# Patient Record
Sex: FEMALE | Race: WHITE | NOT HISPANIC OR LATINO | Employment: UNEMPLOYED | URBAN - NONMETROPOLITAN AREA
[De-identification: names, ages, dates, MRNs, and addresses within clinical notes are randomized per-mention and may not be internally consistent; named-entity substitution may affect disease eponyms.]

---

## 2023-12-31 DIAGNOSIS — A59.9 TRICHIMONIASIS: Primary | ICD-10-CM

## 2023-12-31 RX ORDER — METRONIDAZOLE 500 MG/1
500 TABLET ORAL 2 TIMES DAILY
Qty: 14 TABLET | Refills: 0 | Status: SHIPPED | OUTPATIENT
Start: 2023-12-31

## 2024-04-30 ENCOUNTER — TRANSCRIBE ORDERS (OUTPATIENT)
Dept: LAB | Facility: HOSPITAL | Age: 24
End: 2024-04-30
Payer: COMMERCIAL

## 2024-04-30 ENCOUNTER — LAB (OUTPATIENT)
Dept: LAB | Facility: HOSPITAL | Age: 24
End: 2024-04-30
Payer: COMMERCIAL

## 2024-04-30 DIAGNOSIS — R53.82 CHRONIC FATIGUE: ICD-10-CM

## 2024-04-30 DIAGNOSIS — Z20.2 VENEREAL DISEASE CONTACT: ICD-10-CM

## 2024-04-30 DIAGNOSIS — E11.9 DIABETES MELLITUS WITHOUT COMPLICATION: ICD-10-CM

## 2024-04-30 DIAGNOSIS — E78.5 HYPERLIPIDEMIA, UNSPECIFIED HYPERLIPIDEMIA TYPE: ICD-10-CM

## 2024-04-30 DIAGNOSIS — Z13.30 ENCOUNTER FOR SCREENING EXAMINATION FOR MENTAL HEALTH AND BEHAVIORAL DISORDERS, UNSPECIFIED: ICD-10-CM

## 2024-04-30 DIAGNOSIS — D51.9 ANEMIA DUE TO VITAMIN B12 DEFICIENCY, UNSPECIFIED B12 DEFICIENCY TYPE: ICD-10-CM

## 2024-04-30 DIAGNOSIS — Z13.30 ENCOUNTER FOR BEHAVIORAL HEALTH SCREENING: ICD-10-CM

## 2024-04-30 DIAGNOSIS — I10 ESSENTIAL HYPERTENSION, MALIGNANT: ICD-10-CM

## 2024-04-30 DIAGNOSIS — Z13.30 ENCOUNTER FOR BEHAVIORAL HEALTH SCREENING: Primary | ICD-10-CM

## 2024-04-30 DIAGNOSIS — E55.9 VITAMIN D DEFICIENCY: ICD-10-CM

## 2024-04-30 LAB
DEPRECATED RDW RBC AUTO: 40.1 FL (ref 37–54)
ERYTHROCYTE [DISTWIDTH] IN BLOOD BY AUTOMATED COUNT: 12.2 % (ref 12.3–15.4)
HCT VFR BLD AUTO: 36 % (ref 34–46.6)
HGB BLD-MCNC: 11.9 G/DL (ref 12–15.9)
MCH RBC QN AUTO: 29.8 PG (ref 26.6–33)
MCHC RBC AUTO-ENTMCNC: 33.1 G/DL (ref 31.5–35.7)
MCV RBC AUTO: 90 FL (ref 79–97)
PLATELET # BLD AUTO: 297 10*3/MM3 (ref 140–450)
PMV BLD AUTO: 10.5 FL (ref 6–12)
RBC # BLD AUTO: 4 10*6/MM3 (ref 3.77–5.28)
T3FREE SERPL-MCNC: 2.61 PG/ML (ref 2–4.4)
T4 SERPL-MCNC: 5.6 MCG/DL (ref 4.5–11.7)
WBC NRBC COR # BLD AUTO: 6.28 10*3/MM3 (ref 3.4–10.8)

## 2024-04-30 PROCEDURE — 86695 HERPES SIMPLEX TYPE 1 TEST: CPT

## 2024-04-30 PROCEDURE — 80074 ACUTE HEPATITIS PANEL: CPT

## 2024-04-30 PROCEDURE — 80061 LIPID PANEL: CPT

## 2024-04-30 PROCEDURE — 83036 HEMOGLOBIN GLYCOSYLATED A1C: CPT

## 2024-04-30 PROCEDURE — G0432 EIA HIV-1/HIV-2 SCREEN: HCPCS

## 2024-04-30 PROCEDURE — 82306 VITAMIN D 25 HYDROXY: CPT

## 2024-04-30 PROCEDURE — 80050 GENERAL HEALTH PANEL: CPT

## 2024-04-30 PROCEDURE — 84436 ASSAY OF TOTAL THYROXINE: CPT

## 2024-04-30 PROCEDURE — 84481 FREE ASSAY (FT-3): CPT

## 2024-04-30 PROCEDURE — 86696 HERPES SIMPLEX TYPE 2 TEST: CPT

## 2024-04-30 PROCEDURE — 36415 COLL VENOUS BLD VENIPUNCTURE: CPT

## 2024-04-30 PROCEDURE — 82607 VITAMIN B-12: CPT

## 2024-04-30 PROCEDURE — 86592 SYPHILIS TEST NON-TREP QUAL: CPT

## 2024-05-01 LAB
25(OH)D3 SERPL-MCNC: 25.8 NG/ML (ref 30–100)
ALBUMIN SERPL-MCNC: 4 G/DL (ref 3.5–5.2)
ALBUMIN/GLOB SERPL: 1.5 G/DL
ALP SERPL-CCNC: 110 U/L (ref 39–117)
ALT SERPL W P-5'-P-CCNC: 12 U/L (ref 1–33)
ANION GAP SERPL CALCULATED.3IONS-SCNC: 9.2 MMOL/L (ref 5–15)
AST SERPL-CCNC: 16 U/L (ref 1–32)
BILIRUB SERPL-MCNC: 0.4 MG/DL (ref 0–1.2)
BUN SERPL-MCNC: 12 MG/DL (ref 6–20)
BUN/CREAT SERPL: 14 (ref 7–25)
CALCIUM SPEC-SCNC: 9.1 MG/DL (ref 8.6–10.5)
CHLORIDE SERPL-SCNC: 104 MMOL/L (ref 98–107)
CHOLEST SERPL-MCNC: 160 MG/DL (ref 0–200)
CO2 SERPL-SCNC: 26.8 MMOL/L (ref 22–29)
CREAT SERPL-MCNC: 0.86 MG/DL (ref 0.57–1)
EGFRCR SERPLBLD CKD-EPI 2021: 97.5 ML/MIN/1.73
GLOBULIN UR ELPH-MCNC: 2.6 GM/DL
GLUCOSE SERPL-MCNC: 85 MG/DL (ref 65–99)
HAV IGM SERPL QL IA: NORMAL
HBA1C MFR BLD: 4.9 % (ref 4.8–5.6)
HBV CORE IGM SERPL QL IA: NORMAL
HBV SURFACE AG SERPL QL IA: NORMAL
HCV AB SER QL: NORMAL
HDLC SERPL-MCNC: 50 MG/DL (ref 40–60)
HIV 1+2 AB+HIV1 P24 AG SERPL QL IA: NORMAL
LDLC SERPL CALC-MCNC: 96 MG/DL (ref 0–100)
LDLC/HDLC SERPL: 1.92 {RATIO}
POTASSIUM SERPL-SCNC: 3.8 MMOL/L (ref 3.5–5.2)
PROT SERPL-MCNC: 6.6 G/DL (ref 6–8.5)
RPR SER QL: NORMAL
SODIUM SERPL-SCNC: 140 MMOL/L (ref 136–145)
TRIGL SERPL-MCNC: 70 MG/DL (ref 0–150)
TSH SERPL DL<=0.05 MIU/L-ACNC: 2.24 UIU/ML (ref 0.27–4.2)
VIT B12 BLD-MCNC: 449 PG/ML (ref 211–946)
VLDLC SERPL-MCNC: 14 MG/DL (ref 5–40)

## 2024-05-02 LAB
HSV1 IGG SER IA-ACNC: <0.91 INDEX (ref 0–0.9)
HSV2 IGG SER IA-ACNC: <0.91 INDEX (ref 0–0.9)

## 2024-05-13 ENCOUNTER — OFFICE VISIT (OUTPATIENT)
Dept: PSYCHIATRY | Facility: CLINIC | Age: 24
End: 2024-05-13
Payer: COMMERCIAL

## 2024-05-13 VITALS
HEART RATE: 64 BPM | BODY MASS INDEX: 38.73 KG/M2 | HEIGHT: 66 IN | DIASTOLIC BLOOD PRESSURE: 70 MMHG | WEIGHT: 241 LBS | SYSTOLIC BLOOD PRESSURE: 102 MMHG | OXYGEN SATURATION: 99 %

## 2024-05-13 DIAGNOSIS — F11.21 OPIOID USE DISORDER, SEVERE, IN EARLY REMISSION: Primary | ICD-10-CM

## 2024-05-13 DIAGNOSIS — F10.21 ALCOHOL USE DISORDER, SEVERE, IN EARLY REMISSION: ICD-10-CM

## 2024-05-13 DIAGNOSIS — F15.21 METHAMPHETAMINE USE DISORDER, SEVERE, IN EARLY REMISSION: ICD-10-CM

## 2024-05-13 DIAGNOSIS — F12.21 CANNABIS USE DISORDER, SEVERE, IN EARLY REMISSION: ICD-10-CM

## 2024-05-13 DIAGNOSIS — F17.200 NICOTINE DEPENDENCE, UNCOMPLICATED, UNSPECIFIED NICOTINE PRODUCT TYPE: ICD-10-CM

## 2024-05-13 PROCEDURE — 1160F RVW MEDS BY RX/DR IN RCRD: CPT | Performed by: NURSE PRACTITIONER

## 2024-05-13 PROCEDURE — 1159F MED LIST DOCD IN RCRD: CPT | Performed by: NURSE PRACTITIONER

## 2024-05-13 PROCEDURE — 90792 PSYCH DIAG EVAL W/MED SRVCS: CPT | Performed by: NURSE PRACTITIONER

## 2024-05-13 RX ORDER — METHOCARBAMOL 750 MG/1
TABLET, FILM COATED ORAL
COMMUNITY
Start: 2024-04-09

## 2024-05-13 RX ORDER — NALTREXONE 380 MG
KIT INTRAMUSCULAR
COMMUNITY
Start: 2024-04-25

## 2024-05-13 RX ORDER — CARIPRAZINE 1.5 MG/1
CAPSULE, GELATIN COATED ORAL
COMMUNITY
Start: 2024-04-09

## 2024-05-13 RX ORDER — PSEUDOEPHED/ACETAMINOPH/DIPHEN 30MG-500MG
TABLET ORAL
COMMUNITY
Start: 2024-02-28

## 2024-05-13 RX ORDER — IBUPROFEN 400 MG/1
1 TABLET ORAL
COMMUNITY
Start: 2024-02-28

## 2024-05-13 RX ORDER — NICOTINE 21 MG/24HR
1 PATCH, TRANSDERMAL 24 HOURS TRANSDERMAL EVERY 24 HOURS
Qty: 30 PATCH | Refills: 2 | Status: SHIPPED | OUTPATIENT
Start: 2024-05-13

## 2024-05-13 RX ORDER — HYDROXYZINE HYDROCHLORIDE 25 MG/1
TABLET, FILM COATED ORAL
COMMUNITY
Start: 2024-04-09

## 2024-05-13 RX ORDER — CLONIDINE HYDROCHLORIDE 0.1 MG/1
TABLET ORAL
COMMUNITY
Start: 2024-05-01

## 2024-05-13 RX ORDER — NALTREXONE HYDROCHLORIDE 50 MG/1
TABLET, FILM COATED ORAL
Qty: 30 TABLET | Refills: 0 | Status: SHIPPED | OUTPATIENT
Start: 2024-05-13

## 2024-05-13 NOTE — PROGRESS NOTES
"     New Patient Office Visit        Patient Name: Augusta Weiss  : 2000   MRN: [id number]     Referring Provider: Alva Da Silva APRN    Chief Complaint: Substance use    History of Present Illness:   Augusta Weiss is a 23 y.o. female who is here today for initial evaluation with provider related to substance use. Initial substance at age 17 with marijuana. Use increased over times and at peak was smoking about 8 grams daily. Last use 113 days ago. A alcohol use started around age 18.  Was on and off until she was about 21.  At age 21, triggered by the death of her dad, use escalated quickly.  At peak was drinking about 3- 5ths of whiskey daily.  Last use 113 days ago.  At age 19 recreational use of pain medication began.  Tried gabapentin on a couple occasions but no regular use.  Opioid pain medication use escalated over time. At peak was using about 10-20 Percocet 15 mg daily.  Last intake 113 days ago.  Use of cocaine and methamphetamine began around age [age].  Cocaine use at peak was every other day as she could afford it.  Last intake 2024.  Methamphetamine use was more regular and use increased over time.  Peak at 3 to 4 g a day.  Last intake 120 days ago.  Tried benzodiazepines a few times but no regular use.  Currently 1 pack/day cigarette smoker.    Cravings for opioids and methamphetamine are about every other day and typically triggered by chronic pain and depressed mood. Previous ANGELIQUE treatment includes residential stay at [facility]) x 30 days.  Discharged 2024 and has been in sober living at [facility]. Identifies sober support system of her [employer] coyle, who is in sober living with her. Started on Vivitrol while at [facility]. Has received 2-3 injections. Last 2024. Medication working well to control alcohol cravings but is still struggling with opioid and meth cravings. Would like to try buprenorphine/naloxone. Motivated to change as \"for my son”.     Has psych " history of MDD, ECTOR, and possibly bipolar depression. Today reports symptoms are well controlled on Vraylar 1.5 mg daily and hydroxyzine 25 mg 3 times daily as needed.  Recently started on clonidine 0.1 mg daily to help with cravings but she does not feel it has been effective.  Has some days that she is down but overall is feeling very good.  Does report history of periods of worsening depression followed by 4 to 5 days of hypomania.  Denies prior psychiatric hospitalizations. Denies SI/HI, AVH. +FH of ANGELIQUE in father, brother. +trauma hx. Currently under the care of psych at [facility].    PMH includes chronic back pain.  Has had 2 x-rays but no formal workup.  Never been seen by Ortho. Currently has a PCP.  Recent Hep C, HIV, RPR negative. No chance of pregnancy. Has Implanon.  Denies DT/seizure history.    Currently lives in [place] with her [facility] and [facility] housemates. From [place].  Has one son (2yo) that is in the custody of patients great aunt and grandmother.  Highest level of education completed was high school. Currently employed part-time by [employer]. License is nonactive and does not have a vehicle.  Denies current legal issues related to use.     Triggers: Pain, depression    Cravings: Daily to every other day for opioids and methamphetamine    Relapse Prevention: MAT, psych med mgmt,     Urine Drug Screen (today's visit) discussed: N/a    UDS Confirmation: N/a    MYRON (PDMP) Reviewed for Current/Active Medications: No active medications    DSM 5 Substance Use Disorder Checklist     Diagnostic Criteria   (Substance use disorder requires at least 2 criteria be met within 12 month period)   Meets Criteria          Yes / No  Notes/Supporting Information    Substance often taken in larger amounts or over a longer period than intended.  yes Methamphetamine, alcohol, opioids   2.  There is a persistent desire or unsuccessful effort to cut        down or control th substance use.  yes  Methamphetamine, alcohol, marijuana, opioids   3.  A great deal of time is spent in activities necessary to        obtain the substance, use the substance, or recover        from its effects.  yes Methamphetamine, alcohol, marijuana, opioids   4.  Craving or a strong desire to use the substance.  yes Methamphetamine, marijuana, opioids   5.  Recurrent substance use resulting in failure to fulfill        major role obligations at work, school, or home.  yes Methamphetamine, opioids   6.  Continued substance use despite having persistent or         recurrent social or interpersonal problems caused or         exacerbated by the effects of the substance.  yes Methamphetamine   7.   Important social, occupational or recreational activities        are given up or reduced because of substance use.  yes Methamphetamine, alcohol, marijuana, opioids   8.   Recurrent substance use in situations in which it is        physically hazardous.  yes Methamphetamine, alcohol, marijuana, opioids   9.  Continued use despite knowledge of having a         persistent or recurrent physical or psychological         problem that is likely to have been caused or        exacerbated by the substance.  yes Methamphetamine, alcohol, marijuana, opioids   10. * Tolerance, as defined by either of the following:          a. A need for markedly increased amounts of the              Substance to achieve intoxication or desired effect.          b. Markedly diminished effect with continued use of              The same substance.  yes Methamphetamine, alcohol, opioids   11.  * Withdrawal, as manifested by either of the following:         a. The characteristic withdrawal for the substance.          b. The same (or closely related) substance is taken to               Relieve or avoid withdrawal symptoms.  yes Methamphetamine, alcohol, opioids   **This criterion is not considered to be met for those individuals taking prescriptions opiates solely under  medical supervision. **   Severity: Mild: 2-3 symptoms, Moderate: 4-5 symptoms, Severe: 6 or more symptoms.      Methamphetamine 11 of 11, alcohol 7 of 11, marijuana 6 of 11, opioids 10 off 11    Past Surgical History:  History reviewed. No pertinent surgical history.    Problem List:  There is no problem list on file for this patient.      Allergy:   No Known Allergies     Current Medications:   Current Outpatient Medications   Medication Sig Dispense Refill    Acetaminophen Extra Strength 500 MG tablet TAKE TWO TABLETS BY MOUTH EVERY 6 HOURS AS NEEDED      cloNIDine (CATAPRES) 0.1 MG tablet       hydrOXYzine (ATARAX) 25 MG tablet       ibuprofen (ADVIL,MOTRIN) 400 MG tablet Take 1 tablet by mouth 6 (Six) Times a Day.      methocarbamol (ROBAXIN) 750 MG tablet       Vivitrol 380 MG reconstituted suspension IM suspension       Vraylar 1.5 MG capsule capsule       naltrexone (DEPADE) 50 MG tablet 0.5-1 tabs daily as needed for breakthrough cravings. Use sparingly. 30 tablet 0    nicotine (Nicoderm CQ) 21 MG/24HR patch Place 1 patch on the skin as directed by provider Daily. 30 patch 2     No current facility-administered medications for this visit.       Past Medical History:  Past Medical History:   Diagnosis Date    Alcohol abuse     Anxiety     Chronic pain disorder     Depression     Substance abuse     Withdrawal symptoms, drug or narcotic        Social History:  Social History     Socioeconomic History    Marital status: Single   Tobacco Use    Smoking status: Every Day     Current packs/day: 1.00     Average packs/day: 1 pack/day for 1 year (1.0 ttl pk-yrs)     Types: Cigarettes     Start date: 10/1/2023     Passive exposure: Current    Smokeless tobacco: Never   Vaping Use    Vaping status: Every Day    Substances: Nicotine, Flavoring    Devices: Disposable    Passive vaping exposure: Yes   Substance and Sexual Activity    Alcohol use: Not Currently     Comment: 2 fifths a day    Drug use: Not Currently      Types: Cocaine(coke), Hydrocodone, Marijuana, Methamphetamines, Opium, Oxycodone    Sexual activity: Yes     Partners: Male     Birth control/protection: Nexplanon       Family History:  Family History   Problem Relation Age of Onset    Anxiety disorder Mother     Depression Mother     Drug abuse Father     Alcohol abuse Maternal Grandfather     ADD / ADHD Brother     Drug abuse Brother          Subjective      Review of Systems:   Review of Systems   Constitutional:  Negative for chills, fatigue and fever.   Respiratory:  Negative for shortness of breath.    Cardiovascular:  Negative for chest pain.   Gastrointestinal:  Negative for abdominal pain.   Musculoskeletal:  Positive for back pain.   Skin:  Negative for skin lesions.   Neurological:  Negative for seizures and confusion.   Psychiatric/Behavioral:  Positive for stress. Negative for hallucinations, sleep disturbance, suicidal ideas and depressed mood. The patient is not nervous/anxious.        PHQ-9 Depression Screening  Little interest or pleasure in doing things? 1-->several days   Feeling down, depressed, or hopeless? 1-->several days   Trouble falling or staying asleep, or sleeping too much? 0-->not at all   Feeling tired or having little energy? 2-->more than half the days   Poor appetite or overeating? 0-->not at all   Feeling bad about yourself - or that you are a failure or have let yourself or your family down? 0-->not at all   Trouble concentrating on things, such as reading the newspaper or watching television? 1-->several days   Moving or speaking so slowly that other people could have noticed? Or the opposite - being so fidgety or restless that you have been moving around a lot more than usual? 0-->not at all   Thoughts that you would be better off dead, or of hurting yourself in some way? 0-->not at all   PHQ-9 Total Score 5   If you checked off any problems, how difficult have these problems made it for you to do your work, take care of things  at home, or get along with other people? not difficult at all       ECTOR-7 Score:   Feeling nervous, anxious or on edge: More than half the days  Not being able to stop or control worrying: Several days  Worrying too much about different things: Several days  Trouble Relaxing: Several days  Being so restless that it is hard to sit still: Several days  Feeling afraid as if something awful might happen: Not at all  Becoming easily annoyed or irritable: Several days  ECTOR 7 Total Score: 7  If you checked any problems, how difficult have these problems made it for you to do your work, take care of things at home, or get along with other people: Not difficult at all    Patient History:   The following portions of the patient's history were reviewed and updated as appropriate: allergies, current medications, past family history, past medical history, past social history, past surgical history and problem list.     Social:  Social History     Socioeconomic History    Marital status: Single   Tobacco Use    Smoking status: Every Day     Current packs/day: 1.00     Average packs/day: 1 pack/day for 1 year (1.0 ttl pk-yrs)     Types: Cigarettes     Start date: 10/1/2023     Passive exposure: Current    Smokeless tobacco: Never   Vaping Use    Vaping status: Every Day    Substances: Nicotine, Flavoring    Devices: Disposable    Passive vaping exposure: Yes   Substance and Sexual Activity    Alcohol use: Not Currently     Comment: 2 fifths a day    Drug use: Not Currently     Types: Cocaine(coke), Hydrocodone, Marijuana, Methamphetamines, Opium, Oxycodone    Sexual activity: Yes     Partners: Male     Birth control/protection: Nexplanon       Medications:     Current Outpatient Medications:     Acetaminophen Extra Strength 500 MG tablet, TAKE TWO TABLETS BY MOUTH EVERY 6 HOURS AS NEEDED, Disp: , Rfl:     cloNIDine (CATAPRES) 0.1 MG tablet, , Disp: , Rfl:     hydrOXYzine (ATARAX) 25 MG tablet, , Disp: , Rfl:     ibuprofen  "(ADVIL,MOTRIN) 400 MG tablet, Take 1 tablet by mouth 6 (Six) Times a Day., Disp: , Rfl:     methocarbamol (ROBAXIN) 750 MG tablet, , Disp: , Rfl:     Vivitrol 380 MG reconstituted suspension IM suspension, , Disp: , Rfl:     Vraylar 1.5 MG capsule capsule, , Disp: , Rfl:     naltrexone (DEPADE) 50 MG tablet, 0.5-1 tabs daily as needed for breakthrough cravings. Use sparingly., Disp: 30 tablet, Rfl: 0    nicotine (Nicoderm CQ) 21 MG/24HR patch, Place 1 patch on the skin as directed by provider Daily., Disp: 30 patch, Rfl: 2    Objective     Physical Exam  Vitals reviewed.   Constitutional:       General: She is not in acute distress.     Appearance: She is well-developed. She is not ill-appearing.   Pulmonary:      Effort: No respiratory distress.   Neurological:      Mental Status: She is alert and oriented to person, place, and time.      Gait: Gait normal.   Psychiatric:         Attention and Perception: Attention normal.         Mood and Affect: Mood and affect normal.         Speech: Speech normal.         Behavior: Behavior normal. Behavior is cooperative.         Thought Content: Thought content is not paranoid or delusional. Thought content does not include homicidal or suicidal ideation. Thought content does not include homicidal or suicidal plan.         Cognition and Memory: Cognition and memory normal.         Vital Signs:   Vitals:    05/13/24 1022   BP: 102/70   Pulse: 64   SpO2: 99%   Weight: 109 kg (241 lb)   Height: 167.6 cm (66\")     Body mass index is 38.9 kg/m².     Mental Status Exam:   Hygiene:   good  Cooperation:  Cooperative  Eye Contact:  Good  Psychomotor Behavior:  Appropriate  Affect:  Full range  Mood: normal  Speech:  Normal  Thought Process:  Goal directed  Thought Content:  Normal  Suicidal:  None  Homicidal:  None  Hallucinations:  None  Delusion:  None  Memory:  Intact  Orientation:  Person, Place, Time, and Situation  Reliability:  good  Insight:  Good  Judgement:  Fair  Impulse " Control:  Fair    Assessment / Plan      -Discussed MAT treatment options. Currently on Vivitrol and has continued cravings. Interested in changing to buprenorphine/naloxone. Last injection 4/24/2024. Agreeable to try naltrexone 25-50mg daily as needed for breakthrough cravings. Use sparingly, no more than 2-3 days a week. Discussed AE of medication and when to call/RTC. Will follow up at end of Vivitrol cycle and see how she would like to proceed.  -Start Nicoderm 21mg patch daily for NRT as directed. Do not smoke with patch on. Discussed AE of medication and when to call/RTC.   -Continue Vraylar 1.5 mg nightly for MDD versus bipolar depression.  Tolerating well.  Currently filled by psych.  Unsure if she is switching care to our office.  -Continue hydroxyzine 25 mg 3 times daily as needed for anxiety.  Taking typically twice a day.  Tolerating well.  -Advisement to take part in and remain active in 12 Step Recovery Meetings, IOP, and/or 1:1 therapy/counseling and to establish/maintain an active relationship with a recovery sponsor as part of long-term recovery care. Will continue current sober living programming.  -Continued monitoring for illicit substances for patient safety, medication compliance and future care  -Does not need Narcan refill today      Assessment & Plan   Problems Addressed this Visit    None  Visit Diagnoses       Opioid use disorder, severe, in early remission    -  Primary    Relevant Medications    naltrexone (DEPADE) 50 MG tablet    Alcohol use disorder, severe, in early remission        Relevant Medications    naltrexone (DEPADE) 50 MG tablet    Methamphetamine use disorder, severe, in early remission        Relevant Medications    naltrexone (DEPADE) 50 MG tablet    Cannabis use disorder, severe, in early remission        Nicotine dependence, uncomplicated, unspecified nicotine product type        Relevant Medications    nicotine (Nicoderm CQ) 21 MG/24HR patch          Diagnoses          Codes Comments    Opioid use disorder, severe, in early remission    -  Primary ICD-10-CM: F11.21  ICD-9-CM: 304.03     Alcohol use disorder, severe, in early remission     ICD-10-CM: F10.21  ICD-9-CM: 303.93     Methamphetamine use disorder, severe, in early remission     ICD-10-CM: F15.21  ICD-9-CM: 304.43     Cannabis use disorder, severe, in early remission     ICD-10-CM: F12.21  ICD-9-CM: 304.33     Nicotine dependence, uncomplicated, unspecified nicotine product type     ICD-10-CM: F17.200  ICD-9-CM: 305.1             Visit Diagnoses:    ICD-10-CM ICD-9-CM   1. Opioid use disorder, severe, in early remission  F11.21 304.03   2. Alcohol use disorder, severe, in early remission  F10.21 303.93   3. Methamphetamine use disorder, severe, in early remission  F15.21 304.43   4. Cannabis use disorder, severe, in early remission  F12.21 304.33   5. Nicotine dependence, uncomplicated, unspecified nicotine product type  F17.200 305.1       PLAN:  Safety: No acute safety concerns  Risk Assessment: Risk of self-harm acutely is low. Risk of self-harm chronically is also low, but could be further elevated in the event of treatment noncompliance and/or AODA.    TREATMENT PLAN: Continue supportive psychotherapy efforts and medications as indicated. Treatment and medication options discussed during today's visit. Patient acknowledged and verbally consented to continue with current treatment plan and was educated on the importance of compliance with treatment and follow-up appointments.    GOALS:  Short Term Goals: Patient will be compliant with medication, and patient will have no significant medication related side effects.  Patient will be engaged in psychotherapy as indicated.  Patient will report subjective improvement of symptoms.  Long term goals: To stabilize mood and treat/improve subjective symptoms, the patient will stay out of the hospital, the patient will be at an optimal level of functioning, and the patient will  take all medications as prescribed.  The patient/guardian verbalized understanding and agreement with goals that were mutually set.    MEDICATION ISSUES:  MYRON reviewed as expected.  Discussed medication options and treatment plan of prescribed medication as well as the risks, benefits, and side effects including potential falls, possible impaired driving and metabolic adversities among others. Patient is agreeable to call the office with any worsening of symptoms or onset of side effects. Patient is agreeable to call 911 or go to the nearest ER should he/she begin having SI/HI. No medication side effects or related complaints today.       TOBACCO USE:  Current every day smoker less than 3 minutes spent counseling Agreeable to stop    I advised Augusta Weiss of the risks of tobacco use.     MEDS ORDERED DURING VISIT:  New Medications Ordered This Visit   Medications    naltrexone (DEPADE) 50 MG tablet     Si.5-1 tabs daily as needed for breakthrough cravings. Use sparingly.     Dispense:  30 tablet     Refill:  0    nicotine (Nicoderm CQ) 21 MG/24HR patch     Sig: Place 1 patch on the skin as directed by provider Daily.     Dispense:  30 patch     Refill:  2       Return in about 2 weeks (around 2024) for Follow Up Medication.                 This document has been electronically signed by YVONNE Jc  May 13, 2024 13:47 EDT      Part of this note may be an electronic transcription/translation of spoken language to printed text using the Dragon Dictation System.

## 2024-05-28 ENCOUNTER — LAB (OUTPATIENT)
Dept: LAB | Facility: HOSPITAL | Age: 24
End: 2024-05-28
Payer: COMMERCIAL

## 2024-05-28 ENCOUNTER — OFFICE VISIT (OUTPATIENT)
Dept: PSYCHIATRY | Facility: CLINIC | Age: 24
End: 2024-05-28
Payer: COMMERCIAL

## 2024-05-28 VITALS
HEIGHT: 66 IN | OXYGEN SATURATION: 99 % | BODY MASS INDEX: 41.24 KG/M2 | HEART RATE: 72 BPM | WEIGHT: 256.6 LBS | DIASTOLIC BLOOD PRESSURE: 84 MMHG | SYSTOLIC BLOOD PRESSURE: 120 MMHG

## 2024-05-28 DIAGNOSIS — K59.03 DRUG-INDUCED CONSTIPATION: ICD-10-CM

## 2024-05-28 DIAGNOSIS — F11.21 OPIOID USE DISORDER, SEVERE, IN EARLY REMISSION: ICD-10-CM

## 2024-05-28 DIAGNOSIS — F11.21 OPIOID USE DISORDER, SEVERE, IN EARLY REMISSION: Primary | ICD-10-CM

## 2024-05-28 LAB
AMPHET+METHAMPHET UR QL: NEGATIVE
AMPHETAMINES UR QL: NEGATIVE
B-HCG UR QL: NEGATIVE
BARBITURATES UR QL SCN: NEGATIVE
BENZODIAZ UR QL SCN: NEGATIVE
BUPRENORPHINE SERPL-MCNC: NEGATIVE NG/ML
CANNABINOIDS SERPL QL: NEGATIVE
COCAINE UR QL: NEGATIVE
METHADONE UR QL SCN: NEGATIVE
OPIATES UR QL: NEGATIVE
OXYCODONE UR QL SCN: NEGATIVE
PCP UR QL SCN: NEGATIVE
TRICYCLICS UR QL SCN: NEGATIVE

## 2024-05-28 PROCEDURE — 1159F MED LIST DOCD IN RCRD: CPT | Performed by: NURSE PRACTITIONER

## 2024-05-28 PROCEDURE — 1160F RVW MEDS BY RX/DR IN RCRD: CPT | Performed by: NURSE PRACTITIONER

## 2024-05-28 PROCEDURE — 80306 DRUG TEST PRSMV INSTRMNT: CPT

## 2024-05-28 PROCEDURE — 99214 OFFICE O/P EST MOD 30 MIN: CPT | Performed by: NURSE PRACTITIONER

## 2024-05-28 PROCEDURE — 81025 URINE PREGNANCY TEST: CPT | Performed by: NURSE PRACTITIONER

## 2024-05-28 RX ORDER — BUPRENORPHINE AND NALOXONE 2; .5 MG/1; MG/1
1 FILM, SOLUBLE BUCCAL; SUBLINGUAL DAILY
Qty: 7 FILM | Refills: 0 | Status: SHIPPED | OUTPATIENT
Start: 2024-05-28 | End: 2024-06-04

## 2024-05-28 RX ORDER — POLYETHYLENE GLYCOL 3350 17 G/17G
17 POWDER, FOR SOLUTION ORAL DAILY
Qty: 510 G | Refills: 0 | Status: SHIPPED | OUTPATIENT
Start: 2024-05-28 | End: 2024-06-27

## 2024-05-28 NOTE — PROGRESS NOTES
Office  Follow Up Visit      Patient Name: Augusta Weiss  : 2000   MRN: [id number]     Referring Provider: Alva Da Silva APRN    Chief Complaint: Substance use    History of Present Illness:   Augusta Weiss is a 23 y.o. female who is here today for follow up related to substance use. Continues to do well in her sobriety and is now 128 days sober. Is at the end of her Vivitrol cycle. Last injection 33 days ago. Tried taking naltrexone PRN that was not that helpful for breakthrough cravings.  Would still like to try transition to buprenorphine/naloxone. Having cravings for opioids at least twice a day that have been increasing in frequency and intensity. Having some cravings for methamphetamine as well. None for alcohol. Triggered by movies, contact with illicit substance, pain, and fatigue. Nicotine patches have been helpful for cutting back nicotine intake. Mood is good overall. Sleeping well at night. Denies SI/HI, AVH. No other complaints today.    Triggers: Pain, depression    Cravings: worsening, at least twice daily for opioids and methamphetamine    Relapse Prevention: MAT, psych med mgmt,     Urine Drug Screen (today's visit) discussed: UDS negative for all substances tested after visit today    UDS Confirmation: N/a    MYRON (PDMP) Reviewed for Current/Active Medications: No active medications    Past Surgical History:  History reviewed. No pertinent surgical history.    Problem List:  There is no problem list on file for this patient.      Allergy:   No Known Allergies     Current Medications:   Current Outpatient Medications   Medication Sig Dispense Refill    Acetaminophen Extra Strength 500 MG tablet TAKE TWO TABLETS BY MOUTH EVERY 6 HOURS AS NEEDED      cloNIDine (CATAPRES) 0.1 MG tablet       hydrOXYzine (ATARAX) 25 MG tablet       ibuprofen (ADVIL,MOTRIN) 400 MG tablet Take 1 tablet by mouth 6 (Six) Times a Day.      methocarbamol (ROBAXIN) 750 MG tablet       nicotine (Nicoderm CQ) 21  MG/24HR patch Place 1 patch on the skin as directed by provider Daily. 30 patch 2    Vraylar 1.5 MG capsule capsule       buprenorphine-naloxone (Suboxone) 2-0.5 MG film film Place 1 film under the tongue Daily for 7 days. 7 film 0    polyethylene glycol (MiraLax) 17 GM/SCOOP powder Take 17 g by mouth Daily for 30 days. 510 g 0     No current facility-administered medications for this visit.       Past Medical History:  Past Medical History:   Diagnosis Date    Alcohol abuse     Anxiety     Chronic pain disorder     Depression     Substance abuse     Withdrawal symptoms, drug or narcotic        Social History:  Social History     Socioeconomic History    Marital status: Single   Tobacco Use    Smoking status: Every Day     Current packs/day: 1.00     Average packs/day: 1 pack/day for 1.1 years (1.1 ttl pk-yrs)     Types: Cigarettes     Start date: 10/1/2023     Passive exposure: Current    Smokeless tobacco: Never   Vaping Use    Vaping status: Every Day    Substances: Nicotine, Flavoring    Devices: Disposable    Passive vaping exposure: Yes   Substance and Sexual Activity    Alcohol use: Not Currently     Comment: 2 fifths a day    Drug use: Not Currently     Types: Cocaine(coke), Hydrocodone, Marijuana, Methamphetamines, Opium, Oxycodone    Sexual activity: Yes     Partners: Male     Birth control/protection: Nexplanon       Family History:  Family History   Problem Relation Age of Onset    Anxiety disorder Mother     Depression Mother     Drug abuse Father     Alcohol abuse Maternal Grandfather     ADD / ADHD Brother     Drug abuse Brother          Subjective      Review of Systems:   Review of Systems   Constitutional:  Negative for chills, fatigue and fever.   Respiratory:  Negative for shortness of breath.    Cardiovascular:  Negative for chest pain.   Gastrointestinal:  Negative for abdominal pain.   Skin:  Negative for skin lesions.   Neurological:  Negative for seizures and confusion.    Psychiatric/Behavioral:  Positive for stress. Negative for hallucinations, sleep disturbance, suicidal ideas and depressed mood. The patient is not nervous/anxious.      PHQ-9 Depression Screening  Little interest or pleasure in doing things? 1-->several days   Feeling down, depressed, or hopeless? 1-->several days   Trouble falling or staying asleep, or sleeping too much? 1-->several days   Feeling tired or having little energy? 2-->more than half the days   Poor appetite or overeating? 0-->not at all   Feeling bad about yourself - or that you are a failure or have let yourself or your family down? 1-->several days   Trouble concentrating on things, such as reading the newspaper or watching television? 0-->not at all   Moving or speaking so slowly that other people could have noticed? Or the opposite - being so fidgety or restless that you have been moving around a lot more than usual? 0-->not at all   Thoughts that you would be better off dead, or of hurting yourself in some way? 0-->not at all   PHQ-9 Total Score 6   If you checked off any problems, how difficult have these problems made it for you to do your work, take care of things at home, or get along with other people? somewhat difficult     ECTOR-7 Score:   Feeling nervous, anxious or on edge: More than half the days  Not being able to stop or control worrying: Not at all  Worrying too much about different things: Several days  Trouble Relaxing: Several days  Being so restless that it is hard to sit still: More than half the days  Feeling afraid as if something awful might happen: Not at all  Becoming easily annoyed or irritable: More than half the days  ECTOR 7 Total Score: 8  If you checked any problems, how difficult have these problems made it for you to do your work, take care of things at home, or get along with other people: Somewhat difficult    Patient History:   The following portions of the patient's history were reviewed and updated as  appropriate: allergies, current medications, past family history, past medical history, past social history, past surgical history and problem list.     Social:  Social History     Socioeconomic History    Marital status: Single   Tobacco Use    Smoking status: Every Day     Current packs/day: 1.00     Average packs/day: 1 pack/day for 1.1 years (1.1 ttl pk-yrs)     Types: Cigarettes     Start date: 10/1/2023     Passive exposure: Current    Smokeless tobacco: Never   Vaping Use    Vaping status: Every Day    Substances: Nicotine, Flavoring    Devices: Disposable    Passive vaping exposure: Yes   Substance and Sexual Activity    Alcohol use: Not Currently     Comment: 2 fifths a day    Drug use: Not Currently     Types: Cocaine(coke), Hydrocodone, Marijuana, Methamphetamines, Opium, Oxycodone    Sexual activity: Yes     Partners: Male     Birth control/protection: Nexplanon       Medications:     Current Outpatient Medications:     Acetaminophen Extra Strength 500 MG tablet, TAKE TWO TABLETS BY MOUTH EVERY 6 HOURS AS NEEDED, Disp: , Rfl:     cloNIDine (CATAPRES) 0.1 MG tablet, , Disp: , Rfl:     hydrOXYzine (ATARAX) 25 MG tablet, , Disp: , Rfl:     ibuprofen (ADVIL,MOTRIN) 400 MG tablet, Take 1 tablet by mouth 6 (Six) Times a Day., Disp: , Rfl:     methocarbamol (ROBAXIN) 750 MG tablet, , Disp: , Rfl:     nicotine (Nicoderm CQ) 21 MG/24HR patch, Place 1 patch on the skin as directed by provider Daily., Disp: 30 patch, Rfl: 2    Vraylar 1.5 MG capsule capsule, , Disp: , Rfl:     buprenorphine-naloxone (Suboxone) 2-0.5 MG film film, Place 1 film under the tongue Daily for 7 days., Disp: 7 film, Rfl: 0    polyethylene glycol (MiraLax) 17 GM/SCOOP powder, Take 17 g by mouth Daily for 30 days., Disp: 510 g, Rfl: 0    Objective     Physical Exam  Vitals reviewed.   Constitutional:       General: She is not in acute distress.     Appearance: She is well-developed. She is not ill-appearing.   Pulmonary:      Effort: No  "respiratory distress.   Neurological:      Mental Status: She is alert and oriented to person, place, and time.      Gait: Gait normal.   Psychiatric:         Attention and Perception: Attention normal.         Mood and Affect: Mood and affect normal.         Speech: Speech normal.         Behavior: Behavior normal. Behavior is cooperative.         Thought Content: Thought content is not paranoid or delusional. Thought content does not include homicidal or suicidal ideation. Thought content does not include homicidal or suicidal plan.         Cognition and Memory: Cognition and memory normal.         Vital Signs:   Vitals:    05/28/24 1042   BP: 120/84   Pulse: 72   SpO2: 99%   Weight: 116 kg (256 lb 9.6 oz)   Height: 167.6 cm (66\")     Body mass index is 41.42 kg/m².     Mental Status Exam: Reviewed 5/28/2024  Hygiene:   good  Cooperation:  Cooperative  Eye Contact:  Good  Psychomotor Behavior:  Appropriate  Affect:  Full range  Mood: normal  Speech:  Normal  Thought Process:  Goal directed  Thought Content:  Normal  Suicidal:  None  Homicidal:  None  Hallucinations:  None  Delusion:  None  Memory:  Intact  Orientation:  Person, Place, Time, and Situation  Reliability:  good  Insight:  Good  Judgement:  Fair  Impulse Control:  Fair    Assessment / Plan    -Discussed MAT treatment options. Patient desires to start medication assisted treatment with buprenorphine/naloxone. Patient has agreed to participate in all aspects of treatment including follow-up appointments, counseling, group visits, drug testing, lab work, and other requirements as noted in treatment agreement.  -Stop naltrexone 50 mg daily as needed for breakthrough cravings.  -Vivitrol discontinued.  She is on day 33 of her Vivitrol injection.  Discussed naltrexone can be present in her system up to 50 days and may block some of the effects of the buprenorphine.  She verbalized understanding.  -Start buprenorphine/naloxone 2-0.5 mg daily for opioid use " disorder. Discussed AE of medication and when to call/RTC.    -Will have RTC dose at next visit +0 remaining  -Follow 15/15/15 minute administration rule  -Start MiraLAX 17 g daily as needed for constipation.  Has some mild constipation at baseline and opioids have worsened in the past.  Adequate fluid and fiber intake encouraged.  -Continue Nicoderm 21mg patch daily for NRT as directed. Do not smoke with patch on. Discussed AE of medication and when to call/RTC.   -Has Narcan  -Controlled substance agreement and treatment agreement signed and on file  -Safe medications storage discussed  -Advisement to take part in and remain active in 12 Step Recovery Meetings, IOP, and/or 1:1 therapy/counseling and to establish/maintain an active relationship with a recovery sponsor as part of long-term recovery care. Will continue current sober living programming.    Assessment & Plan   Problems Addressed this Visit    None  Visit Diagnoses       Opioid use disorder, severe, in early remission    -  Primary    Relevant Medications    buprenorphine-naloxone (Suboxone) 2-0.5 MG film film    Other Relevant Orders    Urine Drug Screen - Supervised - Urine, Clean Catch    [facility] Tox - Urine, Clean Catch    Pregnancy, Urine - Urine, Clean Catch    Drug-induced constipation        Relevant Medications    polyethylene glycol (MiraLax) 17 GM/SCOOP powder          Diagnoses         Codes Comments    Opioid use disorder, severe, in early remission    -  Primary ICD-10-CM: F11.21  ICD-9-CM: 304.03     Drug-induced constipation     ICD-10-CM: K59.03  ICD-9-CM: 564.09, E980.5               PLAN:  Safety: No acute safety concerns  Risk Assessment: Risk of self-harm acutely is low. Risk of self-harm chronically is also low, but could be further elevated in the event of treatment noncompliance and/or AODA.      TREATMENT PLAN/GOALS: Continue supportive psychotherapy efforts and medications as indicated. Treatment and  medication options discussed during today's visit. Patient acknowledged and verbally consented to continue with current treatment plan and was educated on the importance of compliance with treatment and follow-up appointments.      MEDICATION ISSUES:  MYRON reviewed as expected.  Discussed medication options and treatment plan of prescribed medication as well as the risks, benefits, and side effects including potential falls, possible impaired driving and metabolic adversities among others. Patient is agreeable to call the office with any worsening of symptoms or onset of side effects. Patient is agreeable to call 911 or go to the nearest ER should he/she begin having SI/HI. No medication side effects or related complaints today.     MEDS ORDERED DURING VISIT:  New Medications Ordered This Visit   Medications    polyethylene glycol (MiraLax) 17 GM/SCOOP powder     Sig: Take 17 g by mouth Daily for 30 days.     Dispense:  510 g     Refill:  0    buprenorphine-naloxone (Suboxone) 2-0.5 MG film film     Sig: Place 1 film under the tongue Daily for 7 days.     Dispense:  7 film     Refill:  0       Return in about 8 days (around 6/5/2024) for Follow Up Medication.             This document has been electronically signed by YVONNE Jc  May 28, 2024 13:11 EDT      Part of this note may be an electronic transcription/translation of spoken language to printed text using the Dragon Dictation System.

## 2024-06-01 LAB — REF LAB TEST METHOD: NORMAL

## 2024-06-05 ENCOUNTER — LAB (OUTPATIENT)
Dept: LAB | Facility: HOSPITAL | Age: 24
End: 2024-06-05
Payer: COMMERCIAL

## 2024-06-05 ENCOUNTER — OFFICE VISIT (OUTPATIENT)
Dept: PSYCHIATRY | Facility: CLINIC | Age: 24
End: 2024-06-05
Payer: COMMERCIAL

## 2024-06-05 VITALS
DIASTOLIC BLOOD PRESSURE: 66 MMHG | BODY MASS INDEX: 41.14 KG/M2 | SYSTOLIC BLOOD PRESSURE: 102 MMHG | OXYGEN SATURATION: 98 % | HEIGHT: 66 IN | WEIGHT: 256 LBS | HEART RATE: 78 BPM

## 2024-06-05 DIAGNOSIS — F11.21 OPIOID USE DISORDER, SEVERE, IN EARLY REMISSION: ICD-10-CM

## 2024-06-05 LAB
AMPHET+METHAMPHET UR QL: NEGATIVE
AMPHETAMINES UR QL: NEGATIVE
BARBITURATES UR QL SCN: NEGATIVE
BENZODIAZ UR QL SCN: NEGATIVE
BUPRENORPHINE SERPL-MCNC: POSITIVE NG/ML
CANNABINOIDS SERPL QL: NEGATIVE
COCAINE UR QL: NEGATIVE
METHADONE UR QL SCN: NEGATIVE
OPIATES UR QL: NEGATIVE
OXYCODONE UR QL SCN: NEGATIVE
PCP UR QL SCN: NEGATIVE
TRICYCLICS UR QL SCN: NEGATIVE

## 2024-06-05 PROCEDURE — 99213 OFFICE O/P EST LOW 20 MIN: CPT | Performed by: NURSE PRACTITIONER

## 2024-06-05 PROCEDURE — 80306 DRUG TEST PRSMV INSTRMNT: CPT

## 2024-06-05 PROCEDURE — 1159F MED LIST DOCD IN RCRD: CPT | Performed by: NURSE PRACTITIONER

## 2024-06-05 PROCEDURE — 1160F RVW MEDS BY RX/DR IN RCRD: CPT | Performed by: NURSE PRACTITIONER

## 2024-06-05 RX ORDER — BUPRENORPHINE AND NALOXONE 2; .5 MG/1; MG/1
1 FILM, SOLUBLE BUCCAL; SUBLINGUAL DAILY
Qty: 8 FILM | Refills: 0 | Status: SHIPPED | OUTPATIENT
Start: 2024-06-05 | End: 2024-06-13

## 2024-06-05 NOTE — PROGRESS NOTES
"     Office  Follow Up Visit      Patient Name: Augusta Weiss  : 2000   MRN: [id number]     Referring Provider: Alva Da Silva APRN    Chief Complaint: Substance use    History of Present Illness:   Augusta Weiss is a 23 y.o. female who is here today for follow up related to substance use. At last visit she started buprenorphine/naloxone 2-0.5 mg daily for opioid use disorder. Was at the end of Vivitrol cycle and wanted to change MAT form as she was still having opioid and meth cravings. No alcohol cravings. Cravings have improved since starting buprenorphine/naloxone. Still thinking about opioids throughout the day, worse after 4pm, but they are not really \"cravings\" and are less intense. Having some worsening constipation with use. Taking MiraLAX 17 g as needed for constipation but has not been utilizing daily. No other AE reported. Continues in recovery care at . Mood is good overall. Sleeping well at night. Denies SI/HI, AVH. No other complaints today.    Triggers: Pain, depression    Cravings: worsening, at least twice daily for opioids and methamphetamine    Relapse Prevention: MAT, sober living recovery programming    Urine Drug Screen (today's visit) discussed: Will go after visit today    UDS Confirmation: 2024 negative for all substances tested    MYRON (PDMP) Reviewed for Current/Active Medications: Buprenorphine/naloxone as expected    Past Surgical History:  History reviewed. No pertinent surgical history.    Problem List:  There is no problem list on file for this patient.      Allergy:   No Known Allergies     Current Medications:   Current Outpatient Medications   Medication Sig Dispense Refill    Acetaminophen Extra Strength 500 MG tablet TAKE TWO TABLETS BY MOUTH EVERY 6 HOURS AS NEEDED      buprenorphine-naloxone (Suboxone) 2-0.5 MG film film Place 1 film under the tongue Daily for 8 days. 8 film 0    hydrOXYzine (ATARAX) 25 MG tablet       ibuprofen (ADVIL,MOTRIN) 400 MG tablet " Take 1 tablet by mouth 6 (Six) Times a Day.      methocarbamol (ROBAXIN) 750 MG tablet       nicotine (Nicoderm CQ) 21 MG/24HR patch Place 1 patch on the skin as directed by provider Daily. 30 patch 2    polyethylene glycol (MiraLax) 17 GM/SCOOP powder Take 17 g by mouth Daily for 30 days. 510 g 0    Vraylar 1.5 MG capsule capsule       cloNIDine (CATAPRES) 0.1 MG tablet  (Patient not taking: Reported on 6/5/2024)       No current facility-administered medications for this visit.       Past Medical History:  Past Medical History:   Diagnosis Date    Alcohol abuse     Anxiety     Chronic pain disorder     Depression     Substance abuse     Withdrawal symptoms, drug or narcotic        Social History:  Social History     Socioeconomic History    Marital status: Single   Tobacco Use    Smoking status: Every Day     Current packs/day: 1.00     Average packs/day: 1 pack/day for 1.1 years (1.1 ttl pk-yrs)     Types: Cigarettes     Start date: 10/1/2023     Passive exposure: Current    Smokeless tobacco: Never   Vaping Use    Vaping status: Every Day    Substances: Nicotine, Flavoring    Devices: Disposable    Passive vaping exposure: Yes   Substance and Sexual Activity    Alcohol use: Not Currently     Comment: 2 fifths a day    Drug use: Not Currently     Types: Cocaine(coke), Hydrocodone, Marijuana, Methamphetamines, Opium, Oxycodone    Sexual activity: Yes     Partners: Male     Birth control/protection: Nexplanon       Family History:  Family History   Problem Relation Age of Onset    Anxiety disorder Mother     Depression Mother     Drug abuse Father     Alcohol abuse Maternal Grandfather     ADD / ADHD Brother     Drug abuse Brother          Subjective      Review of Systems:   Review of Systems   Constitutional:  Negative for chills, fatigue and fever.   Respiratory:  Negative for shortness of breath.    Cardiovascular:  Negative for chest pain.   Gastrointestinal:  Negative for abdominal pain.   Skin:  Negative for  skin lesions.   Neurological:  Negative for seizures and confusion.   Psychiatric/Behavioral:  Positive for stress. Negative for hallucinations, sleep disturbance, suicidal ideas and depressed mood. The patient is not nervous/anxious.      PHQ-9 Depression Screening  Little interest or pleasure in doing things? 1-->several days   Feeling down, depressed, or hopeless? 1-->several days   Trouble falling or staying asleep, or sleeping too much? 0-->not at all   Feeling tired or having little energy? 1-->several days   Poor appetite or overeating? 0-->not at all   Feeling bad about yourself - or that you are a failure or have let yourself or your family down? 0-->not at all   Trouble concentrating on things, such as reading the newspaper or watching television? 0-->not at all   Moving or speaking so slowly that other people could have noticed? Or the opposite - being so fidgety or restless that you have been moving around a lot more than usual? 0-->not at all   Thoughts that you would be better off dead, or of hurting yourself in some way? 0-->not at all   PHQ-9 Total Score 3   If you checked off any problems, how difficult have these problems made it for you to do your work, take care of things at home, or get along with other people? not difficult at all     ECTOR-7 Score:   Feeling nervous, anxious or on edge: Several days  Not being able to stop or control worrying: Not at all  Worrying too much about different things: Not at all  Trouble Relaxing: Not at all  Being so restless that it is hard to sit still: Not at all  Feeling afraid as if something awful might happen: Not at all  Becoming easily annoyed or irritable: Several days  ECTOR 7 Total Score: 2  If you checked any problems, how difficult have these problems made it for you to do your work, take care of things at home, or get along with other people: Not difficult at all    Patient History:   The following portions of the patient's history were reviewed and  updated as appropriate: allergies, current medications, past family history, past medical history, past social history, past surgical history and problem list.     Social:  Social History     Socioeconomic History    Marital status: Single   Tobacco Use    Smoking status: Every Day     Current packs/day: 1.00     Average packs/day: 1 pack/day for 1.1 years (1.1 ttl pk-yrs)     Types: Cigarettes     Start date: 10/1/2023     Passive exposure: Current    Smokeless tobacco: Never   Vaping Use    Vaping status: Every Day    Substances: Nicotine, Flavoring    Devices: Disposable    Passive vaping exposure: Yes   Substance and Sexual Activity    Alcohol use: Not Currently     Comment: 2 fifths a day    Drug use: Not Currently     Types: Cocaine(coke), Hydrocodone, Marijuana, Methamphetamines, Opium, Oxycodone    Sexual activity: Yes     Partners: Male     Birth control/protection: Nexplanon       Medications:     Current Outpatient Medications:     Acetaminophen Extra Strength 500 MG tablet, TAKE TWO TABLETS BY MOUTH EVERY 6 HOURS AS NEEDED, Disp: , Rfl:     buprenorphine-naloxone (Suboxone) 2-0.5 MG film film, Place 1 film under the tongue Daily for 8 days., Disp: 8 film, Rfl: 0    hydrOXYzine (ATARAX) 25 MG tablet, , Disp: , Rfl:     ibuprofen (ADVIL,MOTRIN) 400 MG tablet, Take 1 tablet by mouth 6 (Six) Times a Day., Disp: , Rfl:     methocarbamol (ROBAXIN) 750 MG tablet, , Disp: , Rfl:     nicotine (Nicoderm CQ) 21 MG/24HR patch, Place 1 patch on the skin as directed by provider Daily., Disp: 30 patch, Rfl: 2    polyethylene glycol (MiraLax) 17 GM/SCOOP powder, Take 17 g by mouth Daily for 30 days., Disp: 510 g, Rfl: 0    Vraylar 1.5 MG capsule capsule, , Disp: , Rfl:     cloNIDine (CATAPRES) 0.1 MG tablet, , Disp: , Rfl:     Objective     Physical Exam  Vitals reviewed.   Constitutional:       General: She is not in acute distress.     Appearance: She is well-developed. She is not ill-appearing.   Pulmonary:       "Effort: No respiratory distress.   Neurological:      Mental Status: She is alert and oriented to person, place, and time.      Gait: Gait normal.   Psychiatric:         Attention and Perception: Attention normal.         Mood and Affect: Mood and affect normal.         Speech: Speech normal.         Behavior: Behavior normal. Behavior is cooperative.         Thought Content: Thought content is not paranoid or delusional. Thought content does not include homicidal or suicidal ideation. Thought content does not include homicidal or suicidal plan.         Cognition and Memory: Cognition and memory normal.         Vital Signs:   Vitals:    06/05/24 1015   BP: 102/66   Pulse: 78   SpO2: 98%   Weight: 116 kg (256 lb)   Height: 167.6 cm (66\")       Body mass index is 41.32 kg/m².     Mental Status Exam: Reviewed 6/5/2024  Hygiene:   good  Cooperation:  Cooperative  Eye Contact:  Good  Psychomotor Behavior:  Appropriate  Affect:  Full range  Mood: normal  Speech:  Normal  Thought Process:  Goal directed  Thought Content:  Normal  Suicidal:  None  Homicidal:  None  Hallucinations:  None  Delusion:  None  Memory:  Intact  Orientation:  Person, Place, Time, and Situation  Reliability:  good  Insight:  Good  Judgement:  Fair  Impulse Control:  Fair    Assessment / Plan    -Continue buprenorphine/naloxone 2-0.5 mg daily for opioid use disorder. Will hold off on making dose adjustment today. May increase at f/u.  -Will have not have RTC dose at next visit  -Advisement to take part in and remain active in 12 Step Recovery Meetings, IOP, and/or 1:1 therapy/counseling and to establish/maintain an active relationship with a recovery sponsor.   -Continued monitoring for illicit substances for patient safety, medication compliance and future care  -Continue to follow 15/15/15 minute administration rule  -Safe medication storage encouraged  -Does not need Narcan refill today  -Daily use of MiraLAX 17 g daily encouraged. Adequate fluid " and fiber intake encouraged.    Assessment & Plan   Problems Addressed this Visit    None  Visit Diagnoses       Opioid use disorder, severe, in early remission        Relevant Medications    buprenorphine-naloxone (Suboxone) 2-0.5 MG film film          Diagnoses         Codes Comments    Opioid use disorder, severe, in early remission     ICD-10-CM: F11.21  ICD-9-CM: 304.03               PLAN:  Safety: No acute safety concerns  Risk Assessment: Risk of self-harm acutely is low. Risk of self-harm chronically is also low, but could be further elevated in the event of treatment noncompliance and/or AODA.      TREATMENT PLAN/GOALS: Continue supportive psychotherapy efforts and medications as indicated. Treatment and medication options discussed during today's visit. Patient acknowledged and verbally consented to continue with current treatment plan and was educated on the importance of compliance with treatment and follow-up appointments.      MEDICATION ISSUES:  MYRON reviewed as expected.  Discussed medication options and treatment plan of prescribed medication as well as the risks, benefits, and side effects including potential falls, possible impaired driving and metabolic adversities among others. Patient is agreeable to call the office with any worsening of symptoms or onset of side effects. Patient is agreeable to call 911 or go to the nearest ER should he/she begin having SI/HI. No medication side effects or related complaints today.     MEDS ORDERED DURING VISIT:  New Medications Ordered This Visit   Medications    buprenorphine-naloxone (Suboxone) 2-0.5 MG film film     Sig: Place 1 film under the tongue Daily for 8 days.     Dispense:  8 film     Refill:  0     NADEAN:[id number]       Return in about 1 week (around 6/12/2024).             This document has been electronically signed by YVONNE Jc  June 5, 2024 10:30 EDT      Part of this note may be an electronic transcription/translation of spoken  language to printed text using the Dragon Dictation System.

## 2024-06-11 LAB — REF LAB TEST METHOD: NORMAL

## 2024-06-13 ENCOUNTER — LAB (OUTPATIENT)
Dept: LAB | Facility: HOSPITAL | Age: 24
End: 2024-06-13
Payer: COMMERCIAL

## 2024-06-13 ENCOUNTER — OFFICE VISIT (OUTPATIENT)
Dept: PSYCHIATRY | Facility: CLINIC | Age: 24
End: 2024-06-13
Payer: COMMERCIAL

## 2024-06-13 VITALS
BODY MASS INDEX: 41.78 KG/M2 | HEART RATE: 62 BPM | WEIGHT: 260 LBS | SYSTOLIC BLOOD PRESSURE: 106 MMHG | HEIGHT: 66 IN | DIASTOLIC BLOOD PRESSURE: 68 MMHG | OXYGEN SATURATION: 99 %

## 2024-06-13 DIAGNOSIS — F11.21 OPIOID USE DISORDER, SEVERE, IN EARLY REMISSION: ICD-10-CM

## 2024-06-13 PROCEDURE — 80306 DRUG TEST PRSMV INSTRMNT: CPT

## 2024-06-13 RX ORDER — BUPRENORPHINE AND NALOXONE 2; .5 MG/1; MG/1
2 FILM, SOLUBLE BUCCAL; SUBLINGUAL DAILY
Qty: 16 FILM | Refills: 0 | Status: SHIPPED | OUTPATIENT
Start: 2024-06-13 | End: 2024-06-21

## 2024-06-13 NOTE — PROGRESS NOTES
Office  Follow Up Visit      Patient Name: Augusta Weiss  : 2000   MRN: [id number]     Referring Provider: Alva Da Silva APRN    Chief Complaint: Substance use    History of Present Illness:   Augusta Weiss is a 23 y.o. female who is here today for follow up related to substance use. Continues on buprenorphine/naloxone 2-0.5mg daily and is tolerating well overall. Using MiraLax that is starting to help with opioid induced constipation. Does feel like MOUD is wearing off about 4pm and has increase in opioid and methamphetamine cravings, fatigue, sweating. Denies any recurrence of illicit substance or alcohol use. Continues in IOP and meetings. Looking forward to the weekend as she gets to see her son. Brenda gets his one year chip as well. Denies SI/HI, AVH. No other complaints today.    Triggers: Pain, depression    Cravings: worsening, at least twice daily for opioids and methamphetamine    Relapse Prevention: MAT, sober living recovery programming    Urine Drug Screen (today's visit) discussed: Positive buprenorphine on prelim    UDS Confirmation: 2024 positive buprenorphine, norbuprenorphine    MYRON (PDMP) Reviewed for Current/Active Medications: Buprenorphine/naloxone as expected    Past Surgical History:  History reviewed. No pertinent surgical history.    Problem List:  There is no problem list on file for this patient.      Allergy:   No Known Allergies     Current Medications:   Current Outpatient Medications   Medication Sig Dispense Refill    Acetaminophen Extra Strength 500 MG tablet TAKE TWO TABLETS BY MOUTH EVERY 6 HOURS AS NEEDED      buprenorphine-naloxone (Suboxone) 2-0.5 MG film film Place 2 films under the tongue Daily for 8 days. 16 film 0    hydrOXYzine (ATARAX) 25 MG tablet       ibuprofen (ADVIL,MOTRIN) 400 MG tablet Take 1 tablet by mouth 6 (Six) Times a Day.      methocarbamol (ROBAXIN) 750 MG tablet       nicotine (Nicoderm CQ) 21 MG/24HR patch Place 1 patch on the skin  as directed by provider Daily. 30 patch 2    polyethylene glycol (MiraLax) 17 GM/SCOOP powder Take 17 g by mouth Daily for 30 days. 510 g 0    Vraylar 1.5 MG capsule capsule        No current facility-administered medications for this visit.       Past Medical History:  Past Medical History:   Diagnosis Date    Alcohol abuse     Anxiety     Chronic pain disorder     Depression     Substance abuse     Withdrawal symptoms, drug or narcotic        Social History:  Social History     Socioeconomic History    Marital status: Single   Tobacco Use    Smoking status: Every Day     Current packs/day: 1.00     Average packs/day: 1 pack/day for 1.1 years (1.1 ttl pk-yrs)     Types: Cigarettes     Start date: 10/1/2023     Passive exposure: Current    Smokeless tobacco: Never   Vaping Use    Vaping status: Every Day    Substances: Nicotine, Flavoring    Devices: Disposable    Passive vaping exposure: Yes   Substance and Sexual Activity    Alcohol use: Not Currently     Comment: 2 fifths a day    Drug use: Not Currently     Types: Cocaine(coke), Hydrocodone, Marijuana, Methamphetamines, Opium, Oxycodone    Sexual activity: Yes     Partners: Male     Birth control/protection: Nexplanon       Family History:  Family History   Problem Relation Age of Onset    Anxiety disorder Mother     Depression Mother     Drug abuse Father     Alcohol abuse Maternal Grandfather     ADD / ADHD Brother     Drug abuse Brother          Subjective      Review of Systems:   Review of Systems   Constitutional:  Positive for fatigue. Negative for chills and fever.   Respiratory:  Negative for shortness of breath.    Cardiovascular:  Negative for chest pain.   Gastrointestinal:  Negative for abdominal pain.   Skin:  Negative for skin lesions.   Neurological:  Negative for seizures and confusion.   Psychiatric/Behavioral:  Positive for stress. Negative for hallucinations, sleep disturbance, suicidal ideas and depressed mood. The patient is not  nervous/anxious.      PHQ-9 Depression Screening  Little interest or pleasure in doing things? 1-->several days   Feeling down, depressed, or hopeless? 1-->several days   Trouble falling or staying asleep, or sleeping too much? 0-->not at all   Feeling tired or having little energy? 2-->more than half the days   Poor appetite or overeating? 0-->not at all   Feeling bad about yourself - or that you are a failure or have let yourself or your family down? 0-->not at all   Trouble concentrating on things, such as reading the newspaper or watching television? 0-->not at all   Moving or speaking so slowly that other people could have noticed? Or the opposite - being so fidgety or restless that you have been moving around a lot more than usual? 0-->not at all   Thoughts that you would be better off dead, or of hurting yourself in some way? 0-->not at all   PHQ-9 Total Score 4   If you checked off any problems, how difficult have these problems made it for you to do your work, take care of things at home, or get along with other people? not difficult at all     ECTOR-7 Score:   Feeling nervous, anxious or on edge: More than half the days  Not being able to stop or control worrying: Not at all  Worrying too much about different things: Not at all  Trouble Relaxing: Several days  Being so restless that it is hard to sit still: Not at all  Feeling afraid as if something awful might happen: Not at all  Becoming easily annoyed or irritable: More than half the days  ECTOR 7 Total Score: 5  If you checked any problems, how difficult have these problems made it for you to do your work, take care of things at home, or get along with other people: Not difficult at all    Patient History:   The following portions of the patient's history were reviewed and updated as appropriate: allergies, current medications, past family history, past medical history, past social history, past surgical history and problem list.     Social:  Social  History     Socioeconomic History    Marital status: Single   Tobacco Use    Smoking status: Every Day     Current packs/day: 1.00     Average packs/day: 1 pack/day for 1.1 years (1.1 ttl pk-yrs)     Types: Cigarettes     Start date: 10/1/2023     Passive exposure: Current    Smokeless tobacco: Never   Vaping Use    Vaping status: Every Day    Substances: Nicotine, Flavoring    Devices: Disposable    Passive vaping exposure: Yes   Substance and Sexual Activity    Alcohol use: Not Currently     Comment: 2 fifths a day    Drug use: Not Currently     Types: Cocaine(coke), Hydrocodone, Marijuana, Methamphetamines, Opium, Oxycodone    Sexual activity: Yes     Partners: Male     Birth control/protection: Nexplanon       Medications:     Current Outpatient Medications:     Acetaminophen Extra Strength 500 MG tablet, TAKE TWO TABLETS BY MOUTH EVERY 6 HOURS AS NEEDED, Disp: , Rfl:     buprenorphine-naloxone (Suboxone) 2-0.5 MG film film, Place 2 films under the tongue Daily for 8 days., Disp: 16 film, Rfl: 0    hydrOXYzine (ATARAX) 25 MG tablet, , Disp: , Rfl:     ibuprofen (ADVIL,MOTRIN) 400 MG tablet, Take 1 tablet by mouth 6 (Six) Times a Day., Disp: , Rfl:     methocarbamol (ROBAXIN) 750 MG tablet, , Disp: , Rfl:     nicotine (Nicoderm CQ) 21 MG/24HR patch, Place 1 patch on the skin as directed by provider Daily., Disp: 30 patch, Rfl: 2    polyethylene glycol (MiraLax) 17 GM/SCOOP powder, Take 17 g by mouth Daily for 30 days., Disp: 510 g, Rfl: 0    Vraylar 1.5 MG capsule capsule, , Disp: , Rfl:     Objective     Physical Exam  Vitals reviewed.   Constitutional:       General: She is not in acute distress.     Appearance: She is well-developed. She is not ill-appearing.   Pulmonary:      Effort: No respiratory distress.   Neurological:      Mental Status: She is alert and oriented to person, place, and time.      Gait: Gait normal.   Psychiatric:         Attention and Perception: Attention normal.         Mood and  "Affect: Mood and affect normal.         Speech: Speech normal.         Behavior: Behavior normal. Behavior is cooperative.         Thought Content: Thought content is not paranoid or delusional. Thought content does not include homicidal or suicidal ideation. Thought content does not include homicidal or suicidal plan.         Cognition and Memory: Cognition and memory normal.         Vital Signs:   Vitals:    06/13/24 1251   BP: 106/68   Pulse: 62   SpO2: 99%   Weight: 118 kg (260 lb)   Height: 167.6 cm (66\")       Body mass index is 41.97 kg/m².     Mental Status Exam: Reviewed 6/13/2024  Hygiene:   good  Cooperation:  Cooperative  Eye Contact:  Good  Psychomotor Behavior:  Appropriate  Affect:  Full range  Mood: normal  Speech:  Normal  Thought Process:  Goal directed  Thought Content:  Normal  Suicidal:  None  Homicidal:  None  Hallucinations:  None  Delusion:  None  Memory:  Intact  Orientation:  Person, Place, Time, and Situation  Reliability:  good  Insight:  Good  Judgement:  Fair  Impulse Control:  Fair    Assessment / Plan    -Increase buprenorphine/naloxone to 4-1 mg daily for opioid use disorder  -Will have RTC dose at next visit +0 remaining  -Advisement to take part in and remain active in 12 Step Recovery Meetings, IOP, and/or 1:1 therapy/counseling and to establish/maintain an active relationship with a recovery sponsor. Continues to participate in IOP at sober living and regular recovery meetings.  -Continued monitoring for illicit substances for patient safety, medication compliance and future care  -Continue to follow 15/15/15 minute administration rule. Doing well.  -Continued daily use of MiraLAX 17 g daily encouraged. Adequate fluid and fiber intake encouraged.    Assessment & Plan   Problems Addressed this Visit    None  Visit Diagnoses       Opioid use disorder, severe, in early remission        Relevant Medications    buprenorphine-naloxone (Suboxone) 2-0.5 MG film film          Diagnoses  "        Codes Comments    Opioid use disorder, severe, in early remission     ICD-10-CM: F11.21  ICD-9-CM: 304.03               PLAN:  Safety: No acute safety concerns  Risk Assessment: Risk of self-harm acutely is low. Risk of self-harm chronically is also low, but could be further elevated in the event of treatment noncompliance and/or AODA.      TREATMENT PLAN/GOALS: Continue supportive psychotherapy efforts and medications as indicated. Treatment and medication options discussed during today's visit. Patient acknowledged and verbally consented to continue with current treatment plan and was educated on the importance of compliance with treatment and follow-up appointments.      MEDICATION ISSUES:  MYRON reviewed as expected.  Discussed medication options and treatment plan of prescribed medication as well as the risks, benefits, and side effects including potential falls, possible impaired driving and metabolic adversities among others. Patient is agreeable to call the office with any worsening of symptoms or onset of side effects. Patient is agreeable to call 911 or go to the nearest ER should he/she begin having SI/HI. No medication side effects or related complaints today.     MEDS ORDERED DURING VISIT:  New Medications Ordered This Visit   Medications    buprenorphine-naloxone (Suboxone) 2-0.5 MG film film     Sig: Place 2 films under the tongue Daily for 8 days.     Dispense:  16 film     Refill:  0     NADEAN:[id number]       Return in about 1 week (around 6/20/2024) for Follow Up Medication.             This document has been electronically signed by YVONNE Jc  June 13, 2024 20:55 EDT      Part of this note may be an electronic transcription/translation of spoken language to printed text using the Dragon Dictation System.

## 2024-06-18 LAB — REF LAB TEST METHOD: NORMAL

## 2024-06-20 ENCOUNTER — OFFICE VISIT (OUTPATIENT)
Dept: PSYCHIATRY | Facility: CLINIC | Age: 24
End: 2024-06-20
Payer: COMMERCIAL

## 2024-06-20 VITALS
HEART RATE: 82 BPM | SYSTOLIC BLOOD PRESSURE: 108 MMHG | HEIGHT: 66 IN | WEIGHT: 260 LBS | BODY MASS INDEX: 41.78 KG/M2 | OXYGEN SATURATION: 98 % | DIASTOLIC BLOOD PRESSURE: 64 MMHG

## 2024-06-20 DIAGNOSIS — F11.21 OPIOID USE DISORDER, SEVERE, IN EARLY REMISSION: ICD-10-CM

## 2024-06-20 RX ORDER — BUPRENORPHINE AND NALOXONE 2; .5 MG/1; MG/1
2 FILM, SOLUBLE BUCCAL; SUBLINGUAL DAILY
Qty: 26 FILM | Refills: 0 | Status: SHIPPED | OUTPATIENT
Start: 2024-06-20 | End: 2024-07-03

## 2024-06-20 NOTE — PROGRESS NOTES
Office  Follow Up Visit      Patient Name: Augusta Weiss  : 2000   MRN: [id number]     Referring Provider: Alva Da Silva APRN    Chief Complaint: Substance use    History of Present Illness:   Augusta Weiss is a 23 y.o. female who is here today for follow up related to substance use.  Buprenorphine/naloxone increased to 4-1 mg daily at last visit due to feeling like medication was wearing off.  Was having some mild withdrawal symptoms and increase in cravings.  Has done well with dose increase and is happy with current regimen.  Has been utilizing split dosing and feels this is the most beneficial for her.  Still having some cravings for opioids and methamphetamine 2 to 3 days a week but they have improved in frequency and intensity.  Continues to utilize MiraLAX as needed for opioid induced constipation.  Medication is working well.  Continues in IOP and regular recovery meetings through sober living.  Work is going well.  Has had some stressors due to unexpected incarceration of her fiancé.  Gets to see her son this weekend.  Mood is good overall.  Sleeping well most nights.  Denies any SI/HI, AVH.  No other complaints today.      Triggers: Pain, depression    Cravings: Improving, 2 to 3 days a week for opioids and methamphetamine    Relapse Prevention: MAT, sober living recovery programming    Urine Drug Screen (today's visit) discussed: Deferred today    UDS Confirmation: 2024 positive buprenorphine, norbuprenorphine    MYRON (PDMP) Reviewed for Current/Active Medications: Buprenorphine/naloxone as expected    Past Surgical History:  History reviewed. No pertinent surgical history.    Problem List:  There is no problem list on file for this patient.      Allergy:   No Known Allergies     Current Medications:   Current Outpatient Medications   Medication Sig Dispense Refill    Acetaminophen Extra Strength 500 MG tablet TAKE TWO TABLETS BY MOUTH EVERY 6 HOURS AS NEEDED       buprenorphine-naloxone (Suboxone) 2-0.5 MG film film Place 2 films under the tongue Daily for 13 days. 26 film 0    hydrOXYzine (ATARAX) 25 MG tablet       ibuprofen (ADVIL,MOTRIN) 400 MG tablet Take 1 tablet by mouth 6 (Six) Times a Day.      methocarbamol (ROBAXIN) 750 MG tablet       nicotine (Nicoderm CQ) 21 MG/24HR patch Place 1 patch on the skin as directed by provider Daily. 30 patch 2    polyethylene glycol (MiraLax) 17 GM/SCOOP powder Take 17 g by mouth Daily for 30 days. 510 g 0    Vraylar 1.5 MG capsule capsule        No current facility-administered medications for this visit.       Past Medical History:  Past Medical History:   Diagnosis Date    Alcohol abuse     Anxiety     Chronic pain disorder     Depression     Substance abuse     Withdrawal symptoms, drug or narcotic        Social History:  Social History     Socioeconomic History    Marital status: Single   Tobacco Use    Smoking status: Every Day     Current packs/day: 1.00     Average packs/day: 1 pack/day for 1.1 years (1.1 ttl pk-yrs)     Types: Cigarettes     Start date: 10/1/2023     Passive exposure: Current    Smokeless tobacco: Never   Vaping Use    Vaping status: Every Day    Substances: Nicotine, Flavoring    Devices: Disposable    Passive vaping exposure: Yes   Substance and Sexual Activity    Alcohol use: Not Currently     Comment: 2 fifths a day    Drug use: Not Currently     Types: Cocaine(coke), Hydrocodone, Marijuana, Methamphetamines, Opium, Oxycodone    Sexual activity: Yes     Partners: Male     Birth control/protection: Nexplanon       Family History:  Family History   Problem Relation Age of Onset    Anxiety disorder Mother     Depression Mother     Drug abuse Father     Alcohol abuse Maternal Grandfather     ADD / ADHD Brother     Drug abuse Brother          Subjective      Review of Systems:   Review of Systems   Constitutional:  Positive for fatigue. Negative for chills and fever.   Respiratory:  Negative for shortness of  breath.    Cardiovascular:  Negative for chest pain.   Gastrointestinal:  Negative for abdominal pain.   Skin:  Negative for skin lesions.   Neurological:  Negative for seizures and confusion.   Psychiatric/Behavioral:  Positive for stress. Negative for hallucinations, sleep disturbance, suicidal ideas and depressed mood. The patient is not nervous/anxious.      PHQ-9 Depression Screening  Little interest or pleasure in doing things? 1-->several days   Feeling down, depressed, or hopeless? 0-->not at all   Trouble falling or staying asleep, or sleeping too much? 0-->not at all   Feeling tired or having little energy? 2-->more than half the days   Poor appetite or overeating? 0-->not at all   Feeling bad about yourself - or that you are a failure or have let yourself or your family down? 0-->not at all   Trouble concentrating on things, such as reading the newspaper or watching television? 0-->not at all   Moving or speaking so slowly that other people could have noticed? Or the opposite - being so fidgety or restless that you have been moving around a lot more than usual? 0-->not at all   Thoughts that you would be better off dead, or of hurting yourself in some way? 0-->not at all   PHQ-9 Total Score 3   If you checked off any problems, how difficult have these problems made it for you to do your work, take care of things at home, or get along with other people? not difficult at all     ECTOR-7 Score:   Feeling nervous, anxious or on edge: Several days  Not being able to stop or control worrying: Not at all  Worrying too much about different things: Not at all  Trouble Relaxing: Not at all  Being so restless that it is hard to sit still: Not at all  Feeling afraid as if something awful might happen: Not at all  Becoming easily annoyed or irritable: More than half the days  ECTOR 7 Total Score: 3  If you checked any problems, how difficult have these problems made it for you to do your work, take care of things at home,  or get along with other people: Not difficult at all    Patient History:   The following portions of the patient's history were reviewed and updated as appropriate: allergies, current medications, past family history, past medical history, past social history, past surgical history and problem list.     Social:  Social History     Socioeconomic History    Marital status: Single   Tobacco Use    Smoking status: Every Day     Current packs/day: 1.00     Average packs/day: 1 pack/day for 1.1 years (1.1 ttl pk-yrs)     Types: Cigarettes     Start date: 10/1/2023     Passive exposure: Current    Smokeless tobacco: Never   Vaping Use    Vaping status: Every Day    Substances: Nicotine, Flavoring    Devices: Disposable    Passive vaping exposure: Yes   Substance and Sexual Activity    Alcohol use: Not Currently     Comment: 2 fifths a day    Drug use: Not Currently     Types: Cocaine(coke), Hydrocodone, Marijuana, Methamphetamines, Opium, Oxycodone    Sexual activity: Yes     Partners: Male     Birth control/protection: Nexplanon       Medications:     Current Outpatient Medications:     Acetaminophen Extra Strength 500 MG tablet, TAKE TWO TABLETS BY MOUTH EVERY 6 HOURS AS NEEDED, Disp: , Rfl:     buprenorphine-naloxone (Suboxone) 2-0.5 MG film film, Place 2 films under the tongue Daily for 13 days., Disp: 26 film, Rfl: 0    hydrOXYzine (ATARAX) 25 MG tablet, , Disp: , Rfl:     ibuprofen (ADVIL,MOTRIN) 400 MG tablet, Take 1 tablet by mouth 6 (Six) Times a Day., Disp: , Rfl:     methocarbamol (ROBAXIN) 750 MG tablet, , Disp: , Rfl:     nicotine (Nicoderm CQ) 21 MG/24HR patch, Place 1 patch on the skin as directed by provider Daily., Disp: 30 patch, Rfl: 2    polyethylene glycol (MiraLax) 17 GM/SCOOP powder, Take 17 g by mouth Daily for 30 days., Disp: 510 g, Rfl: 0    Vraylar 1.5 MG capsule capsule, , Disp: , Rfl:     Objective     Physical Exam  Vitals reviewed.   Constitutional:       General: She is not in acute  "distress.     Appearance: She is well-developed. She is not ill-appearing.   Pulmonary:      Effort: No respiratory distress.   Neurological:      Mental Status: She is alert and oriented to person, place, and time.      Gait: Gait normal.   Psychiatric:         Attention and Perception: Attention normal.         Mood and Affect: Mood and affect normal.         Speech: Speech normal.         Behavior: Behavior normal. Behavior is cooperative.         Thought Content: Thought content is not paranoid or delusional. Thought content does not include homicidal or suicidal ideation. Thought content does not include homicidal or suicidal plan.         Cognition and Memory: Cognition and memory normal.         Vital Signs:   Vitals:    06/20/24 1222   BP: 108/64   Pulse: 82   SpO2: 98%   Weight: 118 kg (260 lb)   Height: 167.6 cm (66\")       Body mass index is 41.97 kg/m².     Mental Status Exam: Reviewed 6/20/2024  Hygiene:   good  Cooperation:  Cooperative  Eye Contact:  Good  Psychomotor Behavior:  Appropriate  Affect:  Full range  Mood: normal  Speech:  Normal  Thought Process:  Goal directed  Thought Content:  Normal  Suicidal:  None  Homicidal:  None  Hallucinations:  None  Delusion:  None  Memory:  Intact  Orientation:  Person, Place, Time, and Situation  Reliability:  good  Insight:  Good  Judgement:  Fair  Impulse Control:  Fair    Assessment / Plan    -Continue buprenorphine/naloxone 4-1 mg daily for opioid use disorder.  Utilizing split dosing.  Doing well.  -Will have RTC dose at next visit +5 remaining as she had to change appointment date and time after medication sent.  -Advisement to take part in and remain active in 12 Step Recovery Meetings, IOP, and/or 1:1 therapy/counseling and to establish/maintain an active relationship with a recovery sponsor. Continues to participate in IOP at sober living and regular recovery meetings and is actively engaged in recovery content.  -Continued monitoring for illicit " substances for patient safety, medication compliance and future care  -Continue to follow 15/15/15 minute administration rule.  No issues noted.  -Continued daily use of MiraLAX 17 g daily encouraged. Adequate fluid and fiber intake encouraged.  Does not need refill today.    Assessment & Plan   Problems Addressed this Visit    None  Visit Diagnoses       Opioid use disorder, severe, in early remission        Relevant Medications    buprenorphine-naloxone (Suboxone) 2-0.5 MG film film          Diagnoses         Codes Comments    Opioid use disorder, severe, in early remission     ICD-10-CM: F11.21  ICD-9-CM: 304.03               PLAN:  Safety: No acute safety concerns  Risk Assessment: Risk of self-harm acutely is low. Risk of self-harm chronically is also low, but could be further elevated in the event of treatment noncompliance and/or AODA.      TREATMENT PLAN/GOALS: Continue supportive psychotherapy efforts and medications as indicated. Treatment and medication options discussed during today's visit. Patient acknowledged and verbally consented to continue with current treatment plan and was educated on the importance of compliance with treatment and follow-up appointments.      MEDICATION ISSUES:  MYRON reviewed as expected.  Discussed medication options and treatment plan of prescribed medication as well as the risks, benefits, and side effects including potential falls, possible impaired driving and metabolic adversities among others. Patient is agreeable to call the office with any worsening of symptoms or onset of side effects. Patient is agreeable to call 911 or go to the nearest ER should he/she begin having SI/HI. No medication side effects or related complaints today.     MEDS ORDERED DURING VISIT:  New Medications Ordered This Visit   Medications    buprenorphine-naloxone (Suboxone) 2-0.5 MG film film     Sig: Place 2 films under the tongue Daily for 13 days.     Dispense:  26 film     Refill:  0      CLAUDIA:[id number]       Return in about 2 weeks (around 7/4/2024).             This document has been electronically signed by YVONNE Jc  June 20, 2024 14:34 EDT      Part of this note may be an electronic transcription/translation of spoken language to printed text using the Dragon Dictation System.

## 2024-06-28 ENCOUNTER — OFFICE VISIT (OUTPATIENT)
Dept: OBSTETRICS AND GYNECOLOGY | Facility: CLINIC | Age: 24
End: 2024-06-28
Payer: COMMERCIAL

## 2024-06-28 VITALS
WEIGHT: 253 LBS | DIASTOLIC BLOOD PRESSURE: 74 MMHG | SYSTOLIC BLOOD PRESSURE: 128 MMHG | HEIGHT: 66 IN | BODY MASS INDEX: 40.66 KG/M2

## 2024-06-28 DIAGNOSIS — Z30.8 ENCOUNTER FOR OTHER CONTRACEPTIVE MANAGEMENT: ICD-10-CM

## 2024-06-28 DIAGNOSIS — Z01.411 ENCOUNTER FOR GYNECOLOGICAL EXAMINATION (GENERAL) (ROUTINE) WITH ABNORMAL FINDINGS: Primary | ICD-10-CM

## 2024-06-28 DIAGNOSIS — Z12.39 ENCOUNTER FOR BREAST CANCER SCREENING OTHER THAN MAMMOGRAM: ICD-10-CM

## 2024-06-28 DIAGNOSIS — Z11.3 SCREENING EXAMINATION FOR STD (SEXUALLY TRANSMITTED DISEASE): ICD-10-CM

## 2024-06-28 NOTE — PROGRESS NOTES
Chief Complaint  Gynecologic Exam (Patient wants to discuss removal and reinsertion of Nexplanon. )     History of Present Illness:  Patient is 23 y.o.  who presents to [facility] here as a new patient for her annual examination.  Patient reports her last Pap smear was approximately 3 years ago.  Patient had placement of Nexplanon 3 years ago as well.  She reports initially having irregular.'s.  Her menstrual cycles have been more regular recently.  She is desiring to have removal and reinsertion.  She sees nurse practitioner for her primary care.  She did have labs earlier this year.  She is also followed at behavioral health.  He has a history of substance abuse.  Her last menstrual cycle was approximately 1 week ago.    History  Past Medical History:   Diagnosis Date    Alcohol abuse     Anxiety     Chronic pain disorder     Depression     Nexplanon insertion 2021    Substance abuse     Withdrawal symptoms, drug or narcotic      Current Outpatient Medications on File Prior to Visit   Medication Sig Dispense Refill    Etonogestrel (NEXPLANON) 68 MG implant subdermal implant Inject 1 each into the appropriate area of the skin as directed by provider 1 (One) Time.      Acetaminophen Extra Strength 500 MG tablet TAKE TWO TABLETS BY MOUTH EVERY 6 HOURS AS NEEDED      buprenorphine-naloxone (Suboxone) 2-0.5 MG film film Place 2 films under the tongue Daily for 13 days. 26 film 0    hydrOXYzine (ATARAX) 25 MG tablet       ibuprofen (ADVIL,MOTRIN) 400 MG tablet Take 1 tablet by mouth 6 (Six) Times a Day.      methocarbamol (ROBAXIN) 750 MG tablet       nicotine (Nicoderm CQ) 21 MG/24HR patch Place 1 patch on the skin as directed by provider Daily. 30 patch 2    [] polyethylene glycol (MiraLax) 17 GM/SCOOP powder Take 17 g by mouth Daily for 30 days. 510 g 0    Vraylar 1.5 MG capsule capsule        No current facility-administered medications on file prior to visit.     No Known  "Allergies  History reviewed. No pertinent surgical history.  Family History   Problem Relation Age of Onset    Anxiety disorder Mother     Depression Mother     Drug abuse Father     Alcohol abuse Maternal Grandfather     ADD / ADHD Brother     Drug abuse Brother      Social History     Socioeconomic History    Marital status: Single   Tobacco Use    Smoking status: Every Day     Current packs/day: 1.00     Average packs/day: 1 pack/day for 1.1 years (1.1 ttl pk-yrs)     Types: Cigarettes     Start date: 10/1/2023     Passive exposure: Current    Smokeless tobacco: Never   Vaping Use    Vaping status: Every Day    Substances: Nicotine, Flavoring    Devices: Disposable    Passive vaping exposure: Yes   Substance and Sexual Activity    Alcohol use: Not Currently     Comment: 2 fifths a day    Drug use: Not Currently     Types: Cocaine(coke), Hydrocodone, Marijuana, Methamphetamines, Opium, Oxycodone    Sexual activity: Yes     Partners: Male     Birth control/protection: Nexplanon       Physical Examination:  Vital Signs: /74   Ht 167.6 cm (66\")   Wt 115 kg (253 lb)   BMI 40.84 kg/m²     General Appearance: alert, appears stated age, and cooperative  Breasts: Examined in supine position  Symmetric without masses or skin dimpling  Nipples normal without inversion, lesions or discharge  There are no palpable axillary nodes  Abdomen: no masses, no hepatomegaly, no splenomegaly, soft non-tender, no guarding, and no rebound tenderness  Pelvic: Clinical staff was present for exam; pelvic examination required for evaluation.  External genitalia:  normal appearance of the external genitalia including Bartholin's and [place]'s glands.  :  urethral meatus normal;  Vaginal:  normal pink mucosa without prolapse or lesions.  Dark blood noted in vaginal vault.  Cervix:  normal appearance.  Uterus:  normal size, shape and consistency.  Adnexa:  normal bimanual exam of the adnexa.  Pap smear done and specimen sent using " Thin-Prep technique    Data Review:  The following data was reviewed by: Janell Baker MD on 06/28/2024:     Labs:  Comprehensive Metabolic Panel (04/30/2024 13:35)  Lipid Panel (04/30/2024 13:35)  CBC (No Diff) (04/30/2024 13:35)  TSH (04/30/2024 13:35)  T3, Free (04/30/2024 13:35)  T4 (04/30/2024 13:35)  Vitamin D 25 Hydroxy (04/30/2024 13:35)  Vitamin B12 (04/30/2024 13:35)  Hemoglobin A1c (04/30/2024 13:35)  RPR (04/30/2024 13:35)  HIV-1 / O / 2 Ag / Antibody 4th Generation (04/30/2024 13:35)  HSV 1 & 2 - Specific Antibody, IgG (04/30/2024 13:35)  Hepatitis Panel, Acute (04/30/2024 13:35)  Urine Drug Screen - Supervised - Urine, Clean Catch (05/28/2024 11:09)  [facility] Tox - Urine, Clean Catch (05/28/2024 11:09)  Imaging:    Medical Records:  Progress Notes by Elena Riley APRN (06/20/2024 12:30)     Assessment and Plan   1. Encounter for gynecological examination (general) (routine) with abnormal findings  Pap was done today.  If she does not receive the results of the Pap within 2 weeks  time, she was instructed to call to find out the results.  I explained to Augusta that the recommendations for Pap smear interval in a low risk patient  has lengthened to 3 years time.  I encouraged her to be seen yearly for a full physical exam including breast and pelvic exam even during the off years when PAP's will not be performed.   - LIQUID-BASED PAP SMEAR WITH HPV GENOTYPING IF ASCUS ([facility])    2. Encounter for breast cancer screening other than mammogram  Augusta was counseled regarding having clinical breast exams and breast self-awareness.  Women aged 29-39 years of age should have clinical breast exams every 1-3 years and yearly aged 40 and older.  The patient was counseled regarding breast self-awareness focusing on having a sense of what is normal for her breasts so that she can tell if there are changes.  Even small changes should be reported to provider.     3. Screening  examination for STD (sexually transmitted disease)  Pap and cultures as noted.  Plan pending results.  - LIQUID-BASED PAP SMEAR WITH HPV GENOTYPING IF ASCUS ([facility])    4. Encounter for other contraceptive management  Contraceptive counseling was provided.  The various options for contraception was discussed including natural family planning, withdrawal method, barrier methods, spermicides, oral contraception, transdermal patch, vaginal ring, and injections.  The risks, complications, failure rates, and benefits of each were discussed.  Contraceptive counseling was provided regarding long-acting reversible contraception.  The patient was informed that intrauterine devices and contraceptives implants, long-acting reversible contraceptives (LARC), are the most effective reversible contraceptive methods.  The patient was informed there are five IUDs currently on the market in the [place]: the copper-containing IUD and four levonorgestrel-releasing IUDs.  The use of LARCs has increased during the past decade and the unintended pregnancy rate has decreased in part secondary to LARC use.  Continuation rates are higher as well in comparison to those who chose short-acting methods of contraception.  The unintended pregnancy rate for first year of use is less than 1 per 100 women; the implant is 0.05% and 0.2% for the levonorgestrel 52 IUD.  The unintended pregnancy rate first year of use for the copper T IUD is 0.8% but it has a higher discontinuation rate secondary to heavy menstrual bleeding and pain.  The patient was informed regarding the non-contraceptive benefits as well.  Benefits of the LNG IUD include reduction in heavy menstrual bleeding, anemia, dysmenorrhea, endometriosis-related pain, endometrial hyperplasia, endometrial cancer, ovarian cancer, and cervical cancer.  The patient was informed the LNG 52 IUD is FDA approved for management of heavy menstrual bleeding. Non-contraceptive benefits for the implant  include generally lighter menstrual bleeding, although irregular, and improvement in dysmenorrhea.  The patient was also informed regarding the duration of use of each with the Mirena IUD now being approved by the FDA for eight years of use for contraception.  The risks and complications associated with each device were also discussed.  Orders are placed for Nexplanon as noted.  Patient is to follow-up with removal and reinsertion as discussed.  - Etonogestrel (NEXPLANON) 68 MG implant subdermal implant; Inject 1 each into the appropriate area of the skin as directed by provider 1 (One) Time.    Follow Up/Instructions:  Follow up as noted.  Patient was given instructions and counseling regarding her condition or for health maintenance advice. Please see specific information pulled into the AVS if appropriate.     Note: Speech recognition transcription software may have been used to dictate portions of this document.  An attempt at proofreading has been made though minor errors in transcription may still be present.    This note was electronically signed.  Janell Baker M.D.

## 2024-07-08 LAB — REF LAB TEST METHOD: NORMAL
